# Patient Record
Sex: FEMALE | Race: WHITE | Employment: OTHER
[De-identification: names, ages, dates, MRNs, and addresses within clinical notes are randomized per-mention and may not be internally consistent; named-entity substitution may affect disease eponyms.]

---

## 2017-11-19 ENCOUNTER — HEALTH MAINTENANCE LETTER (OUTPATIENT)
Age: 22
End: 2017-11-19

## 2019-07-29 LAB
ABO + RH BLD: NORMAL
ABO + RH BLD: NORMAL
BLD GP AB SCN SERPL QL: NORMAL
HBV SURFACE AG SERPL QL IA: NEGATIVE
HIV 1+2 AB+HIV1 P24 AG SERPL QL IA: NEGATIVE
RUBELLA ABY IGG: NORMAL

## 2020-02-13 LAB — GROUP B STREP PCR: NEGATIVE

## 2020-02-28 ENCOUNTER — HOSPITAL ENCOUNTER (INPATIENT)
Facility: CLINIC | Age: 25
LOS: 3 days | Discharge: HOME | End: 2020-03-02
Attending: OBSTETRICS & GYNECOLOGY | Admitting: OBSTETRICS & GYNECOLOGY
Payer: COMMERCIAL

## 2020-02-28 LAB
ABO + RH BLD: NORMAL
ABO + RH BLD: NORMAL
BLD GP AB SCN SERPL QL: NORMAL
BLOOD BANK CMNT PATIENT-IMP: NORMAL
RUPTURE OF FETAL MEMBRANES BY ROM PLUS: POSITIVE
SPECIMEN EXP DATE BLD: NORMAL

## 2020-02-28 PROCEDURE — 86901 BLOOD TYPING SEROLOGIC RH(D): CPT | Performed by: OBSTETRICS & GYNECOLOGY

## 2020-02-28 PROCEDURE — G0463 HOSPITAL OUTPT CLINIC VISIT: HCPCS | Mod: 25

## 2020-02-28 PROCEDURE — 12000000 ZZH R&B MED SURG/OB

## 2020-02-28 PROCEDURE — 86850 RBC ANTIBODY SCREEN: CPT | Performed by: OBSTETRICS & GYNECOLOGY

## 2020-02-28 PROCEDURE — 25000125 ZZHC RX 250: Performed by: OBSTETRICS & GYNECOLOGY

## 2020-02-28 PROCEDURE — 59025 FETAL NON-STRESS TEST: CPT

## 2020-02-28 PROCEDURE — 25800030 ZZH RX IP 258 OP 636: Performed by: OBSTETRICS & GYNECOLOGY

## 2020-02-28 PROCEDURE — 86900 BLOOD TYPING SEROLOGIC ABO: CPT | Performed by: OBSTETRICS & GYNECOLOGY

## 2020-02-28 PROCEDURE — 84112 EVAL AMNIOTIC FLUID PROTEIN: CPT | Performed by: OBSTETRICS & GYNECOLOGY

## 2020-02-28 PROCEDURE — 36415 COLL VENOUS BLD VENIPUNCTURE: CPT | Performed by: OBSTETRICS & GYNECOLOGY

## 2020-02-28 PROCEDURE — 86780 TREPONEMA PALLIDUM: CPT | Performed by: OBSTETRICS & GYNECOLOGY

## 2020-02-28 RX ORDER — OXYTOCIN/0.9 % SODIUM CHLORIDE 30/500 ML
1-24 PLASTIC BAG, INJECTION (ML) INTRAVENOUS CONTINUOUS
Status: DISCONTINUED | OUTPATIENT
Start: 2020-02-28 | End: 2020-02-29

## 2020-02-28 RX ORDER — ACETAMINOPHEN 325 MG/1
650 TABLET ORAL EVERY 4 HOURS PRN
Status: DISCONTINUED | OUTPATIENT
Start: 2020-02-28 | End: 2020-02-29

## 2020-02-28 RX ORDER — LEVOTHYROXINE SODIUM 50 UG/1
50 TABLET ORAL DAILY
Status: ON HOLD | COMMUNITY
End: 2020-03-02

## 2020-02-28 RX ORDER — OXYTOCIN 10 [USP'U]/ML
10 INJECTION, SOLUTION INTRAMUSCULAR; INTRAVENOUS
Status: DISCONTINUED | OUTPATIENT
Start: 2020-02-28 | End: 2020-02-29

## 2020-02-28 RX ORDER — METHYLERGONOVINE MALEATE 0.2 MG/ML
200 INJECTION INTRAVENOUS
Status: DISCONTINUED | OUTPATIENT
Start: 2020-02-28 | End: 2020-02-29

## 2020-02-28 RX ORDER — ONDANSETRON 2 MG/ML
4 INJECTION INTRAMUSCULAR; INTRAVENOUS EVERY 6 HOURS PRN
Status: DISCONTINUED | OUTPATIENT
Start: 2020-02-28 | End: 2020-02-29

## 2020-02-28 RX ORDER — ONDANSETRON 2 MG/ML
4 INJECTION INTRAMUSCULAR; INTRAVENOUS EVERY 6 HOURS PRN
Status: DISCONTINUED | OUTPATIENT
Start: 2020-02-28 | End: 2020-02-28

## 2020-02-28 RX ORDER — PRENATAL VIT/IRON FUM/FOLIC AC 27MG-0.8MG
1 TABLET ORAL DAILY
COMMUNITY

## 2020-02-28 RX ORDER — OXYTOCIN/0.9 % SODIUM CHLORIDE 30/500 ML
100-340 PLASTIC BAG, INJECTION (ML) INTRAVENOUS CONTINUOUS PRN
Status: DISCONTINUED | OUTPATIENT
Start: 2020-02-28 | End: 2020-02-29

## 2020-02-28 RX ORDER — NALOXONE HYDROCHLORIDE 0.4 MG/ML
.1-.4 INJECTION, SOLUTION INTRAMUSCULAR; INTRAVENOUS; SUBCUTANEOUS
Status: DISCONTINUED | OUTPATIENT
Start: 2020-02-28 | End: 2020-02-29

## 2020-02-28 RX ORDER — CARBOPROST TROMETHAMINE 250 UG/ML
250 INJECTION, SOLUTION INTRAMUSCULAR
Status: DISCONTINUED | OUTPATIENT
Start: 2020-02-28 | End: 2020-02-29

## 2020-02-28 RX ORDER — OXYCODONE AND ACETAMINOPHEN 5; 325 MG/1; MG/1
1 TABLET ORAL
Status: DISCONTINUED | OUTPATIENT
Start: 2020-02-28 | End: 2020-02-29

## 2020-02-28 RX ORDER — IBUPROFEN 400 MG/1
800 TABLET, FILM COATED ORAL
Status: DISCONTINUED | OUTPATIENT
Start: 2020-02-28 | End: 2020-02-29

## 2020-02-28 RX ORDER — SODIUM CHLORIDE, SODIUM LACTATE, POTASSIUM CHLORIDE, CALCIUM CHLORIDE 600; 310; 30; 20 MG/100ML; MG/100ML; MG/100ML; MG/100ML
INJECTION, SOLUTION INTRAVENOUS CONTINUOUS
Status: DISCONTINUED | OUTPATIENT
Start: 2020-02-28 | End: 2020-02-29

## 2020-02-28 RX ORDER — TERBUTALINE SULFATE 1 MG/ML
0.25 INJECTION, SOLUTION SUBCUTANEOUS
Status: DISCONTINUED | OUTPATIENT
Start: 2020-02-28 | End: 2020-02-29

## 2020-02-28 RX ADMIN — Medication 2 MILLI-UNITS/MIN: at 20:00

## 2020-02-28 RX ADMIN — SODIUM CHLORIDE, POTASSIUM CHLORIDE, SODIUM LACTATE AND CALCIUM CHLORIDE: 600; 310; 30; 20 INJECTION, SOLUTION INTRAVENOUS at 19:45

## 2020-02-28 ASSESSMENT — MIFFLIN-ST. JEOR: SCORE: 1531.53

## 2020-02-29 ENCOUNTER — ANESTHESIA (OUTPATIENT)
Dept: OBGYN | Facility: CLINIC | Age: 25
End: 2020-02-29
Payer: COMMERCIAL

## 2020-02-29 ENCOUNTER — ANESTHESIA EVENT (OUTPATIENT)
Dept: OBGYN | Facility: CLINIC | Age: 25
End: 2020-02-29
Payer: COMMERCIAL

## 2020-02-29 PROBLEM — O42.90 PROM (PREMATURE RUPTURE OF MEMBRANES): Status: ACTIVE | Noted: 2020-02-29

## 2020-02-29 LAB
AMPHETAMINES UR QL SCN: NEGATIVE
CANNABINOIDS UR QL: NEGATIVE
COCAINE UR QL: NEGATIVE
OPIATES UR QL SCN: NEGATIVE
PCP UR QL SCN: NEGATIVE
T PALLIDUM AB SER QL: NONREACTIVE

## 2020-02-29 PROCEDURE — 25000128 H RX IP 250 OP 636: Performed by: ANESTHESIOLOGY

## 2020-02-29 PROCEDURE — 25000125 ZZHC RX 250: Performed by: ANESTHESIOLOGY

## 2020-02-29 PROCEDURE — 00HU33Z INSERTION OF INFUSION DEVICE INTO SPINAL CANAL, PERCUTANEOUS APPROACH: ICD-10-PCS | Performed by: ANESTHESIOLOGY

## 2020-02-29 PROCEDURE — 12000035 ZZH R&B POSTPARTUM

## 2020-02-29 PROCEDURE — 25000125 ZZHC RX 250: Performed by: OBSTETRICS & GYNECOLOGY

## 2020-02-29 PROCEDURE — 25800030 ZZH RX IP 258 OP 636: Performed by: OBSTETRICS & GYNECOLOGY

## 2020-02-29 PROCEDURE — 25000132 ZZH RX MED GY IP 250 OP 250 PS 637: Performed by: OBSTETRICS & GYNECOLOGY

## 2020-02-29 PROCEDURE — 80307 DRUG TEST PRSMV CHEM ANLYZR: CPT | Performed by: OBSTETRICS & GYNECOLOGY

## 2020-02-29 PROCEDURE — 25800030 ZZH RX IP 258 OP 636: Performed by: ANESTHESIOLOGY

## 2020-02-29 PROCEDURE — 72200001 ZZH LABOR CARE VAGINAL DELIVERY SINGLE

## 2020-02-29 PROCEDURE — 37000011 ZZH ANESTHESIA WARD SERVICE

## 2020-02-29 PROCEDURE — 3E0R3BZ INTRODUCTION OF ANESTHETIC AGENT INTO SPINAL CANAL, PERCUTANEOUS APPROACH: ICD-10-PCS | Performed by: ANESTHESIOLOGY

## 2020-02-29 PROCEDURE — 0HQ9XZZ REPAIR PERINEUM SKIN, EXTERNAL APPROACH: ICD-10-PCS | Performed by: OBSTETRICS & GYNECOLOGY

## 2020-02-29 RX ORDER — EPHEDRINE SULFATE 50 MG/ML
5 INJECTION, SOLUTION INTRAMUSCULAR; INTRAVENOUS; SUBCUTANEOUS
Status: DISCONTINUED | OUTPATIENT
Start: 2020-02-29 | End: 2020-03-02 | Stop reason: HOSPADM

## 2020-02-29 RX ORDER — NALOXONE HYDROCHLORIDE 0.4 MG/ML
.1-.4 INJECTION, SOLUTION INTRAMUSCULAR; INTRAVENOUS; SUBCUTANEOUS
Status: DISCONTINUED | OUTPATIENT
Start: 2020-02-29 | End: 2020-03-02 | Stop reason: HOSPADM

## 2020-02-29 RX ORDER — ONDANSETRON 4 MG/1
4 TABLET, ORALLY DISINTEGRATING ORAL EVERY 6 HOURS PRN
Status: DISCONTINUED | OUTPATIENT
Start: 2020-02-29 | End: 2020-03-02 | Stop reason: HOSPADM

## 2020-02-29 RX ORDER — AMOXICILLIN 250 MG
1 CAPSULE ORAL 2 TIMES DAILY
Status: DISCONTINUED | OUTPATIENT
Start: 2020-02-29 | End: 2020-03-02 | Stop reason: HOSPADM

## 2020-02-29 RX ORDER — LEVOTHYROXINE SODIUM 50 UG/1
50 TABLET ORAL DAILY
Status: DISCONTINUED | OUTPATIENT
Start: 2020-02-29 | End: 2020-03-02 | Stop reason: HOSPADM

## 2020-02-29 RX ORDER — IBUPROFEN 400 MG/1
800 TABLET, FILM COATED ORAL EVERY 6 HOURS PRN
Status: DISCONTINUED | OUTPATIENT
Start: 2020-02-29 | End: 2020-03-02 | Stop reason: HOSPADM

## 2020-02-29 RX ORDER — LIDOCAINE HYDROCHLORIDE AND EPINEPHRINE 15; 5 MG/ML; UG/ML
INJECTION, SOLUTION EPIDURAL PRN
Status: DISCONTINUED | OUTPATIENT
Start: 2020-02-29 | End: 2020-03-01 | Stop reason: HOSPADM

## 2020-02-29 RX ORDER — HYDROCORTISONE 2.5 %
CREAM (GRAM) TOPICAL 3 TIMES DAILY PRN
Status: DISCONTINUED | OUTPATIENT
Start: 2020-02-29 | End: 2020-03-02 | Stop reason: HOSPADM

## 2020-02-29 RX ORDER — NALBUPHINE HYDROCHLORIDE 10 MG/ML
2.5-5 INJECTION, SOLUTION INTRAMUSCULAR; INTRAVENOUS; SUBCUTANEOUS EVERY 6 HOURS PRN
Status: DISCONTINUED | OUTPATIENT
Start: 2020-02-29 | End: 2020-03-02 | Stop reason: HOSPADM

## 2020-02-29 RX ORDER — OXYCODONE HYDROCHLORIDE 5 MG/1
5 TABLET ORAL EVERY 4 HOURS PRN
Status: DISCONTINUED | OUTPATIENT
Start: 2020-02-29 | End: 2020-03-02 | Stop reason: HOSPADM

## 2020-02-29 RX ORDER — ACETAMINOPHEN 325 MG/1
650 TABLET ORAL EVERY 4 HOURS PRN
Status: DISCONTINUED | OUTPATIENT
Start: 2020-02-29 | End: 2020-03-02 | Stop reason: HOSPADM

## 2020-02-29 RX ORDER — ONDANSETRON 2 MG/ML
4 INJECTION INTRAMUSCULAR; INTRAVENOUS EVERY 6 HOURS PRN
Status: DISCONTINUED | OUTPATIENT
Start: 2020-02-29 | End: 2020-03-02 | Stop reason: HOSPADM

## 2020-02-29 RX ORDER — OXYTOCIN 10 [USP'U]/ML
10 INJECTION, SOLUTION INTRAMUSCULAR; INTRAVENOUS
Status: DISCONTINUED | OUTPATIENT
Start: 2020-02-29 | End: 2020-03-02 | Stop reason: HOSPADM

## 2020-02-29 RX ORDER — OXYTOCIN/0.9 % SODIUM CHLORIDE 30/500 ML
100 PLASTIC BAG, INJECTION (ML) INTRAVENOUS CONTINUOUS
Status: DISCONTINUED | OUTPATIENT
Start: 2020-02-29 | End: 2020-03-02 | Stop reason: HOSPADM

## 2020-02-29 RX ORDER — AMOXICILLIN 250 MG
2 CAPSULE ORAL 2 TIMES DAILY
Status: DISCONTINUED | OUTPATIENT
Start: 2020-02-29 | End: 2020-03-02 | Stop reason: HOSPADM

## 2020-02-29 RX ORDER — OXYTOCIN/0.9 % SODIUM CHLORIDE 30/500 ML
340 PLASTIC BAG, INJECTION (ML) INTRAVENOUS CONTINUOUS PRN
Status: DISCONTINUED | OUTPATIENT
Start: 2020-02-29 | End: 2020-03-02 | Stop reason: HOSPADM

## 2020-02-29 RX ORDER — BISACODYL 10 MG
10 SUPPOSITORY, RECTAL RECTAL DAILY PRN
Status: DISCONTINUED | OUTPATIENT
Start: 2020-03-02 | End: 2020-03-02 | Stop reason: HOSPADM

## 2020-02-29 RX ORDER — LANOLIN 100 %
OINTMENT (GRAM) TOPICAL
Status: DISCONTINUED | OUTPATIENT
Start: 2020-02-29 | End: 2020-03-02 | Stop reason: HOSPADM

## 2020-02-29 RX ADMIN — SODIUM CHLORIDE, POTASSIUM CHLORIDE, SODIUM LACTATE AND CALCIUM CHLORIDE 250 ML: 600; 310; 30; 20 INJECTION, SOLUTION INTRAVENOUS at 02:41

## 2020-02-29 RX ADMIN — IBUPROFEN 800 MG: 400 TABLET ORAL at 15:49

## 2020-02-29 RX ADMIN — IBUPROFEN 800 MG: 400 TABLET ORAL at 20:43

## 2020-02-29 RX ADMIN — SODIUM CHLORIDE, POTASSIUM CHLORIDE, SODIUM LACTATE AND CALCIUM CHLORIDE 1000 ML: 600; 310; 30; 20 INJECTION, SOLUTION INTRAVENOUS at 01:19

## 2020-02-29 RX ADMIN — LIDOCAINE HYDROCHLORIDE AND EPINEPHRINE 2 ML: 15; 5 INJECTION, SOLUTION EPIDURAL at 02:05

## 2020-02-29 RX ADMIN — IBUPROFEN 800 MG: 400 TABLET ORAL at 07:06

## 2020-02-29 RX ADMIN — Medication 340 ML/HR: at 06:08

## 2020-02-29 RX ADMIN — Medication 5 MG: at 02:24

## 2020-02-29 RX ADMIN — Medication 12 ML/HR: at 02:05

## 2020-02-29 RX ADMIN — ACETAMINOPHEN 650 MG: 325 TABLET, FILM COATED ORAL at 07:06

## 2020-02-29 RX ADMIN — SODIUM CHLORIDE, POTASSIUM CHLORIDE, SODIUM LACTATE AND CALCIUM CHLORIDE: 600; 310; 30; 20 INJECTION, SOLUTION INTRAVENOUS at 02:40

## 2020-02-29 RX ADMIN — ACETAMINOPHEN 650 MG: 325 TABLET, FILM COATED ORAL at 20:43

## 2020-02-29 RX ADMIN — SENNOSIDES AND DOCUSATE SODIUM 1 TABLET: 8.6; 5 TABLET ORAL at 08:08

## 2020-02-29 RX ADMIN — SENNOSIDES AND DOCUSATE SODIUM 1 TABLET: 8.6; 5 TABLET ORAL at 20:43

## 2020-02-29 RX ADMIN — LEVOTHYROXINE SODIUM 50 MCG: 50 TABLET ORAL at 08:08

## 2020-02-29 RX ADMIN — ACETAMINOPHEN 650 MG: 325 TABLET, FILM COATED ORAL at 15:49

## 2020-02-29 RX ADMIN — LIDOCAINE HYDROCHLORIDE AND EPINEPHRINE 3 ML: 15; 5 INJECTION, SOLUTION EPIDURAL at 02:04

## 2020-02-29 RX ADMIN — Medication 100 ML/HR: at 06:39

## 2020-02-29 NOTE — PROVIDER NOTIFICATION
02/28/20 2234   Provider Notification   Provider Name/Title Dr Blackwood   Method of Notification Electronic Page   Request Evaluate - Remote   Notification Reason Uterine Activity;Labor Status;SVE;Decels;Status Update   Page to Dr Blackwood:  Notified of recent SVE, variables, current FHR with moderate variability, contractions q2-6.     Dr Blackwood called back:  Pt feeling contractions but says she is coping. Per MD check cervix if not increasing pitocin, otherwise wait if titrating pitocin or per pt's symptoms.

## 2020-02-29 NOTE — PROVIDER NOTIFICATION
02/29/20 0308   Provider Notification   Provider Name/Title Dr Blackwood   Method of Notification Phone   Notification Reason Decels   Per Dr Blackwood,  Half pitocin or turn it off to give baby a break after decels.

## 2020-02-29 NOTE — PROVIDER NOTIFICATION
02/29/20 0033   Provider Notification   Provider Name/Title Dr Blackwood   Method of Notification Electronic Page   Notification Reason SVE;Pain;Uterine Activity;Labor Status   Paged Dr Blackwood:  2/80/-2, soft. Pitocin at 9. CXN 2-4. Pain 6/10, using birthing ball and standing.

## 2020-02-29 NOTE — PLAN OF CARE
Data: Rylee Scott transferred to 421 via wheelchair at 0900. Baby transferred via parent's arms.  Action: Receiving unit notified of transfer: Yes. Patient and family notified of room change. Report given to Martha at 0900. Belongings sent to receiving unit. Accompanied by Registered Nurse. Oriented patient to surroundings. Call light within reach. ID bands double-checked with receiving RN.  Response: Patient tolerated transfer and is stable.

## 2020-02-29 NOTE — PLAN OF CARE
VSS Pt using Ibuprofen and tylenol for pain control. Up independently to the bathroom, voiding in good amounts. IV discontinued. Breastfeeding infant on demand, latching well. Will continue to monitor.

## 2020-02-29 NOTE — ANESTHESIA PROCEDURE NOTES
Peripheral nerve/Neuraxial procedure note : epidural catheter  Pre-Procedure  Performed by Kaden Becerra MD  Location: OB      Pre-Anesthestic Checklist: patient identified, IV checked, risks and benefits discussed, informed consent, pre-op evaluation and at physician/surgeon's request    Timeout  Correct Patient: Yes   Correct Procedure: Yes       Correct Position: Yes     .   Procedure Documentation    .    Procedure: epidural catheter, .   Patient Position:sitting Insertion Site:L3-4  (midline approach) Injection technique: LORT air   Local skin infiltrated with 3 mL of 1% lidocaine.  EVER at 5 cm    Patient Prep/Sterile Barriers; mask, sterile gloves, povidone-iodine 7.5% surgical scrub, patient draped.  .  Needle: ToBioDetegoy needle   Needle Gauge: 17.    Needle Length (Inches) 3.5   # of attempts: 1 and # of redirects:  .    Catheter: 19 G . .  Catheter threaded easily  4 cm epidural space.  .   .    Assessment/Narrative  Paresthesias: No.  .  .  Aspiration negative for heme or CSF  . Test dose of mL lidocaine 1.5% w/ 1:200,000 epinephrine at. Test dose negative for signs of intravascular, subdural or intrathecal injection. Comments:  Test dose of 3cc negative, then additional 2cc of test dose given.    Adhesive spray, tegaderm, and tape to secure  Bolused with 8cc of pump mix

## 2020-02-29 NOTE — PROVIDER NOTIFICATION
02/28/20 2005   Provider Notification   Provider Name/Title Dr Blackwood   Method of Notification Phone   Request Evaluate - Remote   Dr Blackwood called to pt's room to talk to pt. He is following tonight, plan to come in AM unless needed earlier. RN will vtext with updates. Notified MD that pitocin started at 2000. MD ok for RN not perform SVE now with pitocin start since she is not geremias adequately. RN will check cervix at 2200. Pt can have epidural with chase placement when cervix starts to change.

## 2020-02-29 NOTE — ANESTHESIA PREPROCEDURE EVALUATION
"Anesthesia Pre-Procedure Evaluation    Patient: Rylee Scott   MRN: [id number] : 1995          Preoperative Diagnosis: * No surgery found *        Past Medical History:   Diagnosis Date     Acne vulgaris      Constipation 2012     Gastritis 3/16/2012     GERD (gastroesophageal reflux disease) 2012     Irritable bowel syndrome 2012     Thyroid disease      Past Surgical History:   Procedure Laterality Date     NO HISTORY OF SURGERY                          Lab Results   Component Value Date    WBC 7.6 2012    HGB 13.4 2012    HCT 39.1 2012     2012    CRP <5.0 2012    SED 6 2012     2012    POTASSIUM 4.1 2012    CHLORIDE 103 2012    CO2 25 2012    BUN 7 2012    CR 0.62 2012    GLC 77 2012    AMADEO 9.7 2012    ALBUMIN 4.9 2012    PROTTOTAL 7.6 2012    ALT 16 2012    AST 22 2012    ALKPHOS 64 2012    BILITOTAL 0.8 2012    LIPASE 134 2012    AMYLASE 105 2012    TSH 2.41 2012       Preop Vitals  BP Readings from Last 3 Encounters:   20 101/63   10/22/14 110/60   13 106/74 (30 %/ 79 %)*     *BP percentiles are based on the 2017 AAP Clinical Practice Guideline for girls    Pulse Readings from Last 3 Encounters:   10/22/14 87   13 99   13 116      Resp Readings from Last 3 Encounters:   20 16   13 20   13 20    SpO2 Readings from Last 3 Encounters:   20 100%   13 100%   13 99%      Temp Readings from Last 1 Encounters:   20 36.8  C (98.2  F) (Temporal)    Ht Readings from Last 1 Encounters:   20 1.702 m (5' 7\")      Wt Readings from Last 1 Encounters:   20 74.4 kg (164 lb)    Estimated body mass index is 25.69 kg/m  as calculated from the following:    Height as of this encounter: 1.702 m (5' 7\").    Weight as of this encounter: 74.4 kg (164 lb).       Anesthesia Plan      History " & Physical Review      ASA Status:  2 .        Plan for Epidural     Healthy primip, no problems during pregnancy.  Latex and bactrim allergies      Postoperative Care      Consents                 Kaden Becerra MD

## 2020-02-29 NOTE — PLAN OF CARE
Primip admitted for SROM. Pt in room when RN arrived for shift. Assessment obtained. Monitors placed. Pt and boyfriend oriented to room, department and call light. RN will get PIV and start pitocin. POC discussed with pt and boyfriend Nickolas. Pt would like epidural when in active labor.

## 2020-02-29 NOTE — PROVIDER NOTIFICATION
02/29/20 0519   Provider Notification   Provider Name/Title Dr Blackwood   Method of Notification Phone   Notified Dr Blackwood that pt is complete and baby is really low, head is visible when pt brings legs back. No practices pushes performed. MD will come for delivery.

## 2020-02-29 NOTE — H&P
OB HISTORY AND PHYSICAL    Patient Name: Rylee Scott   Admit Date:   MR #: [id number]   Acct #: [id number]   : 1995     Chief Complaint/Reason for Visit: LOF    History of Present Illness: Rylee Scott is a 24 year old  at 38w5d who presented the afternoon before for gross LOF at 1630 and was admitted for PROM. She was started on Pitocin for augmentation and subsequently progressed rather rapidly.    Her pregnancy has been largely uncomplicated. She declined genetic testing. She passed her 1hr GTT. She is Rh pos and GBS neg. She has hypothyroidism but has been stable.    Review of Systems:  General: denies fevers  CV: denies CP  Pulm: denies SOB  Neuro: denies HA  Abd: denies N/V  Gyn: denies vaginal discharge  OB: see HPI  Skin: denies rashes  MSK: denies calf pain  Psych: denies depression     History:   OB History    Para Term  AB Living   3 0 0 0 2 0   SAB TAB Ectopic Multiple Live Births   2 0 0 0 0      # Outcome Date GA Lbr Sadiq/2nd Weight Sex Delivery Anes PTL Lv   3 Current            2 SAB 2019           1 2018               Past Medical History:   Diagnosis Date     Acne vulgaris      Constipation 2012     Gastritis 3/16/2012     GERD (gastroesophageal reflux disease) 2012     Irritable bowel syndrome 2012     Thyroid disease        Past Surgical History:   Procedure Laterality Date     NO HISTORY OF SURGERY         Family History   Problem Relation Age of Onset     Cancer Mother         skin cancer, not melanoma     Thyroid Disease Mother         hypothyroid     Cancer Maternal Grandmother         uterine cancer and BCC     Thyroid Disease Maternal Grandmother      Cancer Paternal Grandmother         skin cancer     Diabetes Other         maternal aunt     C.A.D. Other         mggm - [place] - in 70's     Unknown/Adopted Other         PGrandma, Aunt, Cousin, PGreat Grandma - all had Gall Bladder taken out     Thyroid Disease Maternal Aunt          Hyperthyroid, after giving birth     Asthma No family hx of      Hypertension No family hx of      Breast Cancer No family hx of      Cancer - colorectal No family hx of      Osteoporosis No family hx of        Social History     Socioeconomic History     Marital status: Single     Spouse name: Not on file     Number of children: 0     Years of education: Not on file     Highest education level: Not on file   Occupational History     Occupation: [employer]     Employer: STUDENT     Occupation:      Comment: [employer] [place]     Occupation:       Comment: [employer]   Social Needs     Financial resource strain: Not on file     Food insecurity:     Worry: Not on file     Inability: Not on file     Transportation needs:     Medical: Not on file     Non-medical: Not on file   Tobacco Use     Smoking status: Never Smoker     Smokeless tobacco: Never Used   Substance and Sexual Activity     Alcohol use: No     Drug use: No     Sexual activity: Yes     Partners: Male     Birth control/protection: Condom   Lifestyle     Physical activity:     Days per week: Not on file     Minutes per session: Not on file     Stress: Not on file   Relationships     Social connections:     Talks on phone: Not on file     Gets together: Not on file     Attends Judaism service: Not on file     Active member of club or organization: Not on file     Attends meetings of clubs or organizations: Not on file     Relationship status: Not on file     Intimate partner violence:     Fear of current or ex partner: Not on file     Emotionally abused: Not on file     Physically abused: Not on file     Forced sexual activity: Not on file   Other Topics Concern     Not on file   Social History Narrative     Not on file       Allergy Information:        I have reviewed the patient's allergies.         Allergies   Allergen Reactions     Sulfamethoxazole W/Trimethoprim Rash and GI Disturbance     Latex Hives        Home Medications:   No current outpatient medications on file.       Physical Examination:  Vitals:  Temp:  [97.6  F (36.4  C)-99.1  F (37.3  C)] 99  F (37.2  C)  Resp:  [16-18] 16  BP: ()/(53-77) 108/65  SpO2:  [94 %-100 %] 99 %    Exam:  General: no acute distress  Head: normocephalic, atraumatic  Eyes: EOMI  CV: pulses intact  Pulm: no increased effort  Neuro: CN II-XII grossly intact  Abd: gravid, soft, NTTP  Gyn: complete, +3  Skin: no rashes  MSK: no calf tenderness  Psych: AOx3, appropriate mood/affect    Fetus: FHT: 140, moderate variability, positive accels, variable decels with pushing; [place]: q3min ctx.    Laboratory and Additional Data Reviewed:  Any associated labs, path, imaging personally reviewed  Results for orders placed or performed during the hospital encounter of 02/28/20   Hepatitis B surface antigen     Status: None   Result Value Ref Range    Hep B Surface Agn negative    HIV Antigen Antibody Combo     Status: None   Result Value Ref Range    HIV Antigen Antibody Combo negative    Rubella Antibody IgG Quantitative     Status: None   Result Value Ref Range    Rubella MINERVA IgG immune    ABO and Rh     Status: None   Result Value Ref Range    ABO O     RH(D) Pos     Antibody Screen negativd    ABO/Rh type and screen     Status: None   Result Value Ref Range    ABO O     RH(D) Pos     Antibody Screen Neg     Test Valid Only At [facility]        Specimen Expires 03/02/2020    Rupture of Fetal Membranes by ROM Plus     Status: Abnormal   Result Value Ref Range    Rupture of Fetal Membranes by ROM Plus Positive (A) NEG^Negative   Group B strep PCR     Status: None   Result Value Ref Range    Group B Strep PCR negative    Results for orders placed or performed in visit on 02/29/20   Epidural Block     Status: None    Kaden David MD     2/29/2020  2:11 AM    Peripheral nerve/Neuraxial procedure note : epidural catheter  Pre-Procedure  Performed by  Kaden Becerra MD  Location: OB      Pre-Anesthestic Checklist: patient identified, IV checked, risks and   benefits discussed, informed consent, pre-op evaluation and at   physician/surgeon's request    Timeout  Correct Patient: Yes   Correct Procedure: Yes       Correct Position: Yes     .   Procedure Documentation    .    Procedure: epidural catheter, .   Patient Position:sitting Insertion Site:L3-4  (midline approach) Injection   technique: LORT air   Local skin infiltrated with 3 mL of 1% lidocaine.    EVER at 5 cm    Patient Prep/Sterile Barriers; mask, sterile gloves, povidone-iodine 7.5%   surgical scrub, patient draped.  .  Needle: ToSmartPilly needle   Needle Gauge:   17.    Needle Length (Inches) 3.5   # of attempts: 1 and # of redirects:    .    Catheter: 19 G . .  Catheter threaded easily  4 cm epidural space.  .   .      Assessment/Narrative  Paresthesias: No.  .  .  Aspiration negative for heme or CSF  . Test dose   of mL lidocaine 1.5% w/ 1:200,000 epinephrine at. Test dose negative for   signs of intravascular, subdural or intrathecal injection. Comments:  Test   dose of 3cc negative, then additional 2cc of test dose given.    Adhesive spray, tegaderm, and tape to secure  Bolused with 8cc of pump mix             Assessment and Plan: Rylee Scott is a 24 year old  at 38w5d here for PROM, augmentation of labor  - Admit to L&D  - Pitocin for augmentation  - Epidural PRN (already in place)  - Tracing Cat 1 for majority of 1st stage, resolved from Cat 2 with normal resuscitative measures each time deviated from Cat 1  - Complete, start pushing  - Rh pos  - GBS neg  - Otherwise routine OB care      Krish Blackwood MD  2020, 6:36 AM

## 2020-02-29 NOTE — PROVIDER NOTIFICATION
02/29/20 0305   Provider Notification   Provider Name/Title Dr Blackwood   Method of Notification Overhead Page   Request Evaluate - Remote   Notification Reason Decels;Labor Status;Uterine Activity;Pain;SVE;Status Update   Paged Dr Blackwood:  5/95/0. Comfortable with epidural. Pitocin at 9, FHR recovering from variables/lates after epidural. Repositioned, gave bolus, oxygen and O2.

## 2020-02-29 NOTE — PLAN OF CARE
Viable male delivered vaginally. Rn and provider continuously monitoring FHR during pushing efforts.

## 2020-02-29 NOTE — ANESTHESIA PROCEDURE NOTES
Peripheral nerve/Neuraxial procedure note : epidural catheter  Pre-Procedure  Performed by Kaden Becerra MD  Location: pre-op      Pre-Anesthestic Checklist: patient identified, IV checked, risks and benefits discussed, informed consent, pre-op evaluation, at physician/surgeon's request and post-op pain management    Timeout  Correct Patient: Yes   Correct Procedure: Yes   Correct Site: Yes     Correct Position: Yes     .   Procedure Documentation    .    Procedure: epidural catheter, .   Patient Position:sitting Insertion Site:T10-11  (midline approach) Injection technique: LORT air   Local skin infiltrated with 3 mL of 1% lidocaine.  EVER at 4 cm    Patient Prep/Sterile Barriers; mask, sterile gloves, povidone-iodine 7.5% surgical scrub, patient draped.  .  Needle: Touhy needle   Needle Gauge: 17.    Needle Length (Inches) 3.5   # of attempts: 1 and # of redirects:  .    Catheter: 19 G . .  Catheter threaded easily  5 cm epidural space.  .   .    Assessment/Narrative  Paresthesias: No.  .  .  Aspiration negative for heme or CSF  . Test dose of 3 mL lidocaine 1.5% w/ 1:200,000 epinephrine at. Test dose negative for signs of intravascular, subdural or intrathecal injection. Comments:  3cc test dose given, then 1 minute later 2cc additional test dose given  Adhesive spray, tegaderm, and tape to secure catheter

## 2020-02-29 NOTE — PROGRESS NOTES
1815: Pt presents to [facility] for membrane evaluation.  1820: Consent given to place external monitors, and to obtain vital signs and medical history. Pt states she has been leaking fluid since 1630. Pt reports positive fetal movement. Pt denies feeling any ctx, or any vaginal bleeding. Moderate amount of clear fluid seen on chux pad, as well as a small pool on the floor.   1825: SVE 1/70/-2. Membranes felt over fetal head.  1828: Dr. Blackwood updated. Decision to collect ROM+.  1845: ROM+ positive. FHR reactive. Monitors removed. Pt moved to room 219. Will give report to Autumn JEREZ RN, when she arrives at 1900.

## 2020-02-29 NOTE — L&D DELIVERY NOTE
OB Vaginal Delivery Note    Rylee Scott MRN# [id number]   Age: 24 year old YOB: 1995       GA: 38w5d  GP:   Labor Complications: None   Delivery QBL: 100 mL  Delivery Type: Vaginal, Spontaneous   ROM to Delivery Time: (Delivered) Hours: 13 Minutes: 37  [place] Sex: Male (Joseph)  [place] Weight:    Pending for Guillory Hour   1 Minute 5 Minute 10 Minute   Apgar Totals: 7    8          CESAR PRAJAPATI;SIMON LINCOLN;ASHLEY FUNK     Delivery Details:  Rylee Scott, a 24 year old  female delivered a viable infant with apgars of 7   and 8  . Patient was fully dilated and pushing after <12 hours in active labor. Delivery was via vaginal, spontaneous  to a sterile field under epidural  anesthesia. Infant delivered in vertex  middle  mentum  anterior  position. Anterior and posterior shoulders delivered without difficulty. During  resuscitation, RN elevated baby and the cord avulsed. This was quickly/immediately grasped and clamped and suspected fetal loss was minimal to scant. The cord was then clamped a second time, cut, and and 3 vessels  were noted. Cord blood was obtained in routine fashion with the following disposition: lab .      Cord complications: none other than as above  Placenta delivered at 2020  6:12 AM . Placental disposition was Hospital disposal . Fundal massage performed and fundus found to be firm.     Episiotomy: none    Perineum, vagina, cervix were inspected, and the following lacerations were noted:   Perineal lacerations: 1st , no other lacerations    Any lacerations were repaired in the usual fashion using 3-0 Vicryl.    Tracing was largely Category 1 in 1st stage and Category 2 in 2nd stage due to variables with pushing.    Excellent hemostasis was noted. Needle count correct. Infant and patient in delivery room in good and stable condition.        Labor Event Times    Labor onset date:  20 Onset time:   8:00 PM   Dilation complete date:  20  Complete time:   5:10 AM   Start pushing date/time:  2020 0556      Labor Events     labor?:  No   steroids:  None  Labor Type:  Augmentation, Induction  Predominate monitoring during 1st stage:  continuous electronic fetal monitoring     Antibiotics received during labor?:  No     Rupture date/time: 20 1630   Rupture type:  Spontaneous rupture of membranes occuring during spontaneous labor or augmentation  Fluid color:  Clear  Fluid odor:  Normal     Induction:  Oxytocin  Induction date/time:     Cervical ripening date/time:     Indications for induction:  Prolonged ROM     Augmentation:  Oxytocin  Indications for augmentation:  Prolonged ROM, Ineffective Contraction Pattern  1:1 continuous labor support provided by?:  RN       Delivery/Placenta Date and Time    Delivery Date:  20 Delivery Time:   6:07 AM   Placenta Date/Time:  2020  6:12 AM  Oxytocin given at the time of delivery:  after delivery of baby     Vaginal Counts     Initial count performed by 2 team members:   Two Team Members   Dr Jaison Frazier       Needles Suture [place] Sponges Instruments   Initial counts 2 0 5 0   Added to count  1     Final counts 2 1 5 0   Placed during labor Accounted for at the end of labor   NA NA   NA NA   NA NA    Final count performed by 2 team members:   Two Team Members   Dr Jaison Frazier      Final count correct?:  Yes     Apgars    Living status:  Living   1 Minute 5 Minute 10 Minute 15 Minute 20 Minute   Skin color: 0  0       Heart rate: 2  2       Reflex irritability: 1  2       Muscle tone: 2  2       Respiratory effort: 2  2       Total: 7  8       Apgars assigned by:  MAYKEL LINCOLN RN     Cord    Vessels:  3 Vessels Complications:  None   Cord Blood Disposition:  Lab Gases Sent?:  No      Skin to Skin and Feeding Plan    Skin to skin initiation date/time: 1841    Skin to skin with:  Mother  Skin to skin end date/time:        Labor Events and Shoulder Dystocia    Fetal  Tracing Prior to Delivery:  Category 1, Category 2  Fetal Tracing Comments:  Tracing largely Cat 1 in 1st stage, Cat 2 in 2nd stage d/t variable decels w/ pushing  Shoulder dystocia present?:  Neg     Delivery (Maternal) (Provider to Complete) ([id number])    Episiotomy:  None  Perineal lacerations:  1st Repaired?:  Yes   Number of repair packets:  1     Blood Loss  Mother: Scott, Rylee #[id number]   Start of Mother's Information    IO Blood Loss  02/28/20 2000 - 02/29/20 0641    Delivery QBL (mL) Hospital Encounter 100 mL    Total  100 mL         End of Mother's Information  Mother: Scott, Rylee #[id number]         Delivery - Provider to Complete ([id number])    Delivering clinician:  Krish Blackwood MD  Attempted Delivery Types (Choose all that apply):  Spontaneous Vaginal Delivery  Delivery Type (Choose the 1 that will go to the Birth History):  Vaginal, Spontaneous   Other personnel:   Provider Role   Autumn Frazier RN Registered Nurse   Roseanna Coleman RN Registered Nurse   Krish Blackwood MD Obstetrician         Placenta    Delayed Cord Clamping:  Done  Date/Time:  2/29/2020  6:12 AM  Removal:  Spontaneous  Disposition:  Hospital disposal     Anesthesia    Method:  Epidural  Cervical dilation at placement:  0-3          Presentation and Position    Presentation:  Vertex  Position:  Middle Mentum Anterior           Krish Blackwood MD

## 2020-02-29 NOTE — LACTATION NOTE
Initial Lactation visit with Rylee, significant other Nickolas & fermin Gonzalez. Rylee reports feeding is going fair so far, with baby Carlos feeding very well after he was born, and sleepy at times with some good feeds, some sleepier feedings. Encouraged skin to skin with each feeding. Discussed physiology of milk supply initiation and progression, recommended calling for assistance with next feeding and reviewing hand expression. Rylee had general questions regarding milk supply and knowing if fermin Gonzalez is getting enough while breastfeeding. Recommended using the feeding log, encouraged reviewing A New Beginning patient education booklet as able.    Recommend unlimited, frequent breast feedings: At least 8 - 12 times every 24 hours. Recommended rooming in. Instructed in hand expression. Avoid pacifiers and supplementation with formula unless medically indicated. Explained benefits of holding baby skin on skin to help promote better breastfeeding outcomes. Rylee has a pump for home use. Rylee & Nickolas appreciative of visit. Will revisit as needed.    Alpa Stein, BSN, PHN, RN-C, MNN, IBCLC

## 2020-03-01 PROCEDURE — 25000132 ZZH RX MED GY IP 250 OP 250 PS 637: Performed by: OBSTETRICS & GYNECOLOGY

## 2020-03-01 PROCEDURE — 12000035 ZZH R&B POSTPARTUM

## 2020-03-01 RX ADMIN — ACETAMINOPHEN 650 MG: 325 TABLET, FILM COATED ORAL at 15:45

## 2020-03-01 RX ADMIN — ACETAMINOPHEN 650 MG: 325 TABLET, FILM COATED ORAL at 04:18

## 2020-03-01 RX ADMIN — IBUPROFEN 800 MG: 400 TABLET ORAL at 04:18

## 2020-03-01 RX ADMIN — IBUPROFEN 800 MG: 400 TABLET ORAL at 15:45

## 2020-03-01 RX ADMIN — SENNOSIDES AND DOCUSATE SODIUM 1 TABLET: 8.6; 5 TABLET ORAL at 08:05

## 2020-03-01 RX ADMIN — IBUPROFEN 800 MG: 400 TABLET ORAL at 10:18

## 2020-03-01 RX ADMIN — SENNOSIDES AND DOCUSATE SODIUM 1 TABLET: 8.6; 5 TABLET ORAL at 20:50

## 2020-03-01 RX ADMIN — LEVOTHYROXINE SODIUM 50 MCG: 50 TABLET ORAL at 08:05

## 2020-03-01 RX ADMIN — IBUPROFEN 800 MG: 400 TABLET ORAL at 20:50

## 2020-03-01 RX ADMIN — ACETAMINOPHEN 650 MG: 325 TABLET, FILM COATED ORAL at 10:18

## 2020-03-01 RX ADMIN — ACETAMINOPHEN 650 MG: 325 TABLET, FILM COATED ORAL at 20:50

## 2020-03-01 NOTE — PLAN OF CARE
VSS.Ibuprofen & Tylenol providing adequate pain control for perineal discomfort. Also using  tucks pads. Jamee. Reg diet. Voiding. Breast feeds going well.

## 2020-03-01 NOTE — ANESTHESIA POSTPROCEDURE EVALUATION
Patient: Rylee Scott    * No procedures listed *    Diagnosis:* No pre-op diagnosis entered *  Diagnosis Additional Information: No value filed.    Anesthesia Type:  Epidural    Note:  Anesthesia Post Evaluation    Patient location during evaluation: Floor (Postpartum Unit)  Patient participation: Able to fully participate in evaluation  Level of consciousness: awake and alert  Pain management: adequate  Airway patency: patent  Cardiovascular status: acceptable and hemodynamically stable  Respiratory status: acceptable, spontaneous ventilation and unassisted  Hydration status: acceptable  PONV: none       Comments: Pt denies epidural-related complaints.         Last vitals:  Vitals:    02/29/20 1551 02/29/20 2345 03/01/20 0830   BP: 114/72 96/68 108/68   Resp: 16 16 16   Temp: 36.7  C (98  F) 36.6  C (97.9  F) 36.8  C (98.3  F)   SpO2:            Electronically Signed By: Priya Garduno MD  March 1, 2020  4:44 PM

## 2020-03-01 NOTE — LACTATION NOTE
Follow up visit with Rylee, significant other Nickolas & fermin Ruiz.  Rylee reports feeding is going well, although she is occasionally struggling with getting baby Joseph to latch. She did share she's been using a pacifier and is wondering if she should hold off on using it with him. Discussed general recommendation to avoid pacifiers until breastfeeding is well established. Discussed ways to get baby Boris to latch when she's struggling, and encouraged utilizing nursing support with breastfeeding as needed. Reviewed milk supply and engorgement. Rylee had hand expressed drops of colostrum prior to this feeding and was relieved to see colostrum. General questions answered regarding milk supply initiation and production over first 3-5 days postpartum. Discussed typical  feeding behaviors, likelihood of cluster feeding overnight. Breastfeeding section reviewed in [facility] patient education booklet.    Feeding plan: Recommend unlimited, frequent breast feedings: At least 8 - 12 times every 24 hours. Avoid pacifiers and supplementation with formula unless medically indicated. Encouraged use of feeding log and to record feedings, and void/stool patterns. Rylee has a pump for home use.  Encouraged to call with needs, will revisit as needed. Rylee & Nickolas appreciative of visit.    Alpa Stein, BSN, PHN, RN-C, MNN, IBCLC

## 2020-03-01 NOTE — PLAN OF CARE
Vital signs stable. Postpartum assessment WDL. Pain controlled with tylenol and ibuprofen.  Using icepacks and tucks to perineum.  Patient voiding without difficulty. Breastfeeding independently. Patient and infant bonding well. Will continue with current plan of care.

## 2020-03-01 NOTE — PLAN OF CARE
VSS Pt using Ibuprofen and tylenol for pain control. Up independently in the room. Breastfeeding infant on demand, latching well. Will continue to monitor.

## 2020-03-01 NOTE — PROGRESS NOTES
"Rylee Ramses  2020   PPD1 s/p     S: 24 year old  delivered at 38w5d   Doing well without complaints.  Sore but medication helping.   Lochia moderate.   Ambulating.  Urinating without issues.  Breastfeeding. Baby is doing really well with it.    O: BP 96/68   Temp 97.9  F (36.6  C) (Oral)   Resp 16   Ht 1.702 m (5' 7\")   Wt 74.4 kg (164 lb)   SpO2 99%   Breastfeeding Unknown   BMI 25.69 kg/m    Gen: NAD  Abd exam deferred given no complaints and patient just started a BF session  Ext: NT, nonedematous    A/P:  24 year old  PPD1 s/p   - ibuprofen and acetaminophen PRN pain  - support breastfeeding  - monitor lochia  - encourage ambulation  - regular diet  - routine PP care  - anticipate discharge to home tomorrow (PPD2)    Linnea Dallas MD  Text Page (8am - 5pm)    "

## 2020-03-02 ENCOUNTER — HEALTH MAINTENANCE LETTER (OUTPATIENT)
Age: 25
End: 2020-03-02

## 2020-03-02 VITALS
BODY MASS INDEX: 25.74 KG/M2 | RESPIRATION RATE: 16 BRPM | WEIGHT: 164 LBS | TEMPERATURE: 98.3 F | DIASTOLIC BLOOD PRESSURE: 72 MMHG | OXYGEN SATURATION: 99 % | HEIGHT: 67 IN | SYSTOLIC BLOOD PRESSURE: 112 MMHG | HEART RATE: 104 BPM

## 2020-03-02 PROCEDURE — 25000132 ZZH RX MED GY IP 250 OP 250 PS 637: Performed by: OBSTETRICS & GYNECOLOGY

## 2020-03-02 RX ORDER — ACETAMINOPHEN 325 MG/1
650 TABLET ORAL EVERY 4 HOURS PRN
Qty: 60 TABLET | Refills: 0 | Status: SHIPPED | OUTPATIENT
Start: 2020-03-02

## 2020-03-02 RX ORDER — IBUPROFEN 800 MG/1
800 TABLET, FILM COATED ORAL EVERY 8 HOURS PRN
Qty: 60 TABLET | Refills: 0 | Status: SHIPPED | OUTPATIENT
Start: 2020-03-02

## 2020-03-02 RX ORDER — LEVOTHYROXINE SODIUM 50 UG/1
50 TABLET ORAL DAILY
COMMUNITY
Start: 2020-03-03

## 2020-03-02 RX ORDER — AMOXICILLIN 250 MG
1 CAPSULE ORAL 2 TIMES DAILY
Qty: 30 TABLET | Refills: 0 | Status: SHIPPED | OUTPATIENT
Start: 2020-03-02

## 2020-03-02 RX ADMIN — ACETAMINOPHEN 650 MG: 325 TABLET, FILM COATED ORAL at 09:16

## 2020-03-02 RX ADMIN — IBUPROFEN 800 MG: 400 TABLET ORAL at 03:23

## 2020-03-02 RX ADMIN — IBUPROFEN 800 MG: 400 TABLET ORAL at 09:16

## 2020-03-02 RX ADMIN — ACETAMINOPHEN 650 MG: 325 TABLET, FILM COATED ORAL at 03:23

## 2020-03-02 RX ADMIN — LEVOTHYROXINE SODIUM 50 MCG: 50 TABLET ORAL at 07:53

## 2020-03-02 RX ADMIN — SENNOSIDES AND DOCUSATE SODIUM 1 TABLET: 8.6; 5 TABLET ORAL at 07:53

## 2020-03-02 NOTE — PLAN OF CARE
Doing well,vss,voiding with out difficulty,pain control with tylenol&ibuprofen,baby breast feeding well,using lanolin for tender nipple,sore nipple shell given.Plan to discharge today&follow up in clinic in 6 weeks or sooner with any concerns,out patient lactation resource reviewed.

## 2020-03-02 NOTE — PLAN OF CARE
VSS.Ibuprofen & Tylenol providing adequate pain control for perineal discomfort. Also using ice and tucks pads. Jamee. Reg diet. Voiding. Breast feeds going well.

## 2020-03-02 NOTE — PROGRESS NOTES
OB Vaginal Delivery Progress Note    Patient name: Rylee Scott  : 1995  Admit date: 2020  MRN: [id number]  Acct: [id number]      Assessment/Plan:  Rylee Scott is a  who is PPD#2 from .    - HD stable.   - Hypothyroidism: continue levothyroxine.   - Blood type: O, no need for Rhogam  - VMI; breast feeding  - Cont routine PP care. Anticipate d/c home today . Discharge instructions and precautions reviewed.       Subjective:  The patient did well overnight. There were no acute issues. Her pain is well controlled. She notes appropriate lochia. She is tolerating a regular diet well without nausea or vomiting. She has ambulated. She is voiding without difficulty. She denies dizziness, lightheadedness, headache, vision changes, chest pain, shortness of breath, RUQ pain, calf pain, or other complaints on ROS.    Objective:  Vitals:  Temp:  [98  F (36.7  C)-98.3  F (36.8  C)] 98.3  F (36.8  C)  Pulse:  [104] 104  Heart Rate:  [] 87  Resp:  [16-18] 16  BP: (110-121)/(63-78) 112/72    Exam:  General: no acute distress  Cardiovascular: pulses intact, trace peripheral edema  Pulmonary: no respiratory difficulty, normal non-labored breathing  Abdomen: soft, appropriatly tender to palpation, non-distended  Uterus: fundus firm at U-2  Extremities: BL lower extremities non-tender, no palpable cords  Psych: mood appropriate      Maritza Martel MD  3/2/2020, 9:12 AM

## 2020-03-02 NOTE — PLAN OF CARE
Vital signs stable. Postpartum assessment WDL. Pain controlled with tylenol and ibuprofen. Patient voiding without difficulty. Breastfeeding on cue independently. Patient and infant bonding well. Plan to discharge today. Will continue with current plan of care.

## 2020-12-14 ENCOUNTER — HEALTH MAINTENANCE LETTER (OUTPATIENT)
Age: 25
End: 2020-12-14

## 2021-04-18 ENCOUNTER — HEALTH MAINTENANCE LETTER (OUTPATIENT)
Age: 26
End: 2021-04-18

## 2021-05-25 ENCOUNTER — RECORDS - HEALTHEAST (OUTPATIENT)
Dept: ADMINISTRATIVE | Facility: CLINIC | Age: 26
End: 2021-05-25

## 2021-10-02 ENCOUNTER — HEALTH MAINTENANCE LETTER (OUTPATIENT)
Age: 26
End: 2021-10-02

## 2022-05-14 ENCOUNTER — HEALTH MAINTENANCE LETTER (OUTPATIENT)
Age: 27
End: 2022-05-14

## 2022-09-03 ENCOUNTER — HEALTH MAINTENANCE LETTER (OUTPATIENT)
Age: 27
End: 2022-09-03

## 2023-06-03 ENCOUNTER — HEALTH MAINTENANCE LETTER (OUTPATIENT)
Age: 28
End: 2023-06-03